# Patient Record
Sex: MALE | Race: WHITE | NOT HISPANIC OR LATINO | URBAN - METROPOLITAN AREA
[De-identification: names, ages, dates, MRNs, and addresses within clinical notes are randomized per-mention and may not be internally consistent; named-entity substitution may affect disease eponyms.]

---

## 2020-08-06 ENCOUNTER — OFFICE VISIT (OUTPATIENT)
Dept: INTERNAL MEDICINE | Facility: CLINIC | Age: 30
End: 2020-08-06
Payer: COMMERCIAL

## 2020-08-06 VITALS
WEIGHT: 245 LBS | HEART RATE: 78 BPM | OXYGEN SATURATION: 98 % | SYSTOLIC BLOOD PRESSURE: 128 MMHG | DIASTOLIC BLOOD PRESSURE: 78 MMHG | TEMPERATURE: 97.7 F | RESPIRATION RATE: 18 BRPM

## 2020-08-06 DIAGNOSIS — H66.002 NON-RECURRENT ACUTE SUPPURATIVE OTITIS MEDIA OF LEFT EAR WITHOUT SPONTANEOUS RUPTURE OF TYMPANIC MEMBRANE: Primary | ICD-10-CM

## 2020-08-06 DIAGNOSIS — H69.92 DYSFUNCTION OF LEFT EUSTACHIAN TUBE: ICD-10-CM

## 2020-08-06 PROBLEM — J30.2 SEASONAL ALLERGIES: Status: ACTIVE | Noted: 2020-08-06

## 2020-08-06 PROCEDURE — 99202 OFFICE O/P NEW SF 15 MIN: CPT | Performed by: NURSE PRACTITIONER

## 2020-08-06 RX ORDER — FLUTICASONE PROPIONATE 50 MCG
1 SPRAY, SUSPENSION (ML) NASAL DAILY
Qty: 1 BOTTLE | Refills: 5 | Status: SHIPPED | OUTPATIENT
Start: 2020-08-06 | End: 2024-08-05

## 2020-08-06 RX ORDER — AMOXICILLIN AND CLAVULANATE POTASSIUM 875; 125 MG/1; MG/1
1 TABLET, FILM COATED ORAL 2 TIMES DAILY
Qty: 14 TABLET | Refills: 0 | Status: SHIPPED | OUTPATIENT
Start: 2020-08-06 | End: 2020-08-13

## 2020-08-06 SDOH — HEALTH STABILITY: MENTAL HEALTH: HOW OFTEN DO YOU HAVE A DRINK CONTAINING ALCOHOL?: 2-3 TIMES A WEEK

## 2020-08-06 SDOH — HEALTH STABILITY: MENTAL HEALTH: HOW MANY DRINKS CONTAINING ALCOHOL DO YOU HAVE ON A TYPICAL DAY WHEN YOU ARE DRINKING?: 1 OR 2

## 2020-08-06 ASSESSMENT — ENCOUNTER SYMPTOMS
FREQUENCY: 0
VOMITING: 0
ARTHRALGIAS: 0
WEAKNESS: 0
ADENOPATHY: 0
EYE REDNESS: 0
CONSTIPATION: 0
SORE THROAT: 0
NAUSEA: 0
TROUBLE SWALLOWING: 0
SINUS PAIN: 0
FATIGUE: 0
MYALGIAS: 0
SINUS PRESSURE: 0
FEVER: 0
DIARRHEA: 0
LIGHT-HEADEDNESS: 0
CHEST TIGHTNESS: 0
PALPITATIONS: 0
COUGH: 0
DIZZINESS: 0
HEADACHES: 0
CHILLS: 0
DYSURIA: 0
EYE PAIN: 0
WHEEZING: 0
SHORTNESS OF BREATH: 0
ABDOMINAL PAIN: 0

## 2020-08-06 NOTE — PROGRESS NOTES
Subjective      Patient ID: Liban Lofton is a 29 y.o. male.    29-year-old male with a history of seasonal allergies presents to the office as a new patient today.  He like to establish care here.  He is concerned about intermittent bilateral ear pain he has been having for the last 2 to 3 months.  He does report some muffled hearing at times.  He does report popping in his ears as well.  He denies any fever, chills, cough, shortness of breath, chest pain, nasal congestion, postnasal drip, sore throat, ear drainage.  He does have a history of seasonal allergies does not routinely take anything for them.  He has not seen anyone for this issue before.  He does use Q-tips.  Denies any trauma to the ears.      The following have been reviewed and updated as appropriate in this visit:    Tobacco  Allergies  Meds  Problems  Med Hx  Surg Hx  Fam Hx  Soc Hx          Past Medical History:   Diagnosis Date   • Seasonal allergies      History reviewed. No pertinent surgical history.  Family History   Problem Relation Age of Onset   • Skin cancer Biological Mother    • Hypertension Biological Father    • Heart attack Paternal Grandfather 40     Social History     Tobacco Use   • Smoking status: Former Smoker   • Smokeless tobacco: Never Used   Substance Use Topics   • Alcohol use: Yes     Frequency: 2-3 times a week     Drinks per session: 1 or 2   • Drug use: Never       Review of Systems   Constitutional: Negative for chills, fatigue and fever.   HENT: Positive for ear pain. Negative for congestion, ear discharge, postnasal drip, sinus pressure, sinus pain, sore throat and trouble swallowing.    Eyes: Negative for pain and redness.   Respiratory: Negative for cough, chest tightness, shortness of breath and wheezing.    Cardiovascular: Negative for chest pain and palpitations.   Gastrointestinal: Negative for abdominal pain, constipation, diarrhea, nausea and vomiting.   Genitourinary: Negative for dysuria,  frequency and testicular pain.   Musculoskeletal: Negative for arthralgias and myalgias.   Skin: Negative for rash.   Neurological: Negative for dizziness, weakness, light-headedness and headaches.   Hematological: Negative for adenopathy.       No Known Allergies  Current Outpatient Medications   Medication Sig Dispense Refill   • amoxicillin-pot clavulanate (AUGMENTIN) 875-125 mg per tablet Take 1 tablet by mouth 2 (two) times a day for 7 days. 14 tablet 0   • fluticasone propionate (FLONASE) 50 mcg/actuation nasal spray Administer 1 spray into each nostril daily. 1 Bottle 5     No current facility-administered medications for this visit.        Objective   Vitals:    08/06/20 1523   BP: 128/78   Pulse: 78   Resp: 18   Temp: 36.5 °C (97.7 °F)   SpO2: 98%   Weight: 111 kg (245 lb)       Physical Exam   Constitutional: He is oriented to person, place, and time. He appears well-developed and well-nourished. No distress.   HENT:   Head: Normocephalic and atraumatic.   Right Ear: Hearing, external ear and ear canal normal. A middle ear effusion is present.   Left Ear: Hearing, external ear and ear canal normal. Tympanic membrane is erythematous and retracted.   Nose: Nose normal.   Eyes: Conjunctivae and EOM are normal.   Neck: Normal range of motion. Neck supple.   Cardiovascular: Normal rate, regular rhythm and normal heart sounds.   Pulmonary/Chest: Effort normal and breath sounds normal.   Neurological: He is alert and oriented to person, place, and time.   Skin: Skin is warm and dry. Capillary refill takes less than 2 seconds.   Psychiatric: He has a normal mood and affect. His behavior is normal. Judgment and thought content normal.   Vitals reviewed.      Assessment/Plan   Problem List Items Addressed This Visit        Nervous    Non-recurrent acute suppurative otitis media of left ear without spontaneous rupture of tympanic membrane - Primary     Will give course of Augmentin. Advise to call if symptoms persist  or worsen.          Dysfunction of left eustachian tube     PT advised to take flonase and sudafed.  If persists will discuss ENT               NELIDA Day    8/6/2020

## 2020-09-08 ENCOUNTER — OFFICE VISIT (OUTPATIENT)
Dept: INTERNAL MEDICINE | Facility: CLINIC | Age: 30
End: 2020-09-08
Payer: OTHER MISCELLANEOUS

## 2020-09-08 VITALS
DIASTOLIC BLOOD PRESSURE: 68 MMHG | BODY MASS INDEX: 33.27 KG/M2 | TEMPERATURE: 98.4 F | OXYGEN SATURATION: 98 % | HEART RATE: 80 BPM | SYSTOLIC BLOOD PRESSURE: 110 MMHG | HEIGHT: 73 IN | WEIGHT: 251 LBS

## 2020-09-08 DIAGNOSIS — M25.512 ACUTE PAIN OF LEFT SHOULDER: Primary | ICD-10-CM

## 2020-09-08 PROBLEM — S43.432A TEAR OF LEFT GLENOID LABRUM: Status: ACTIVE | Noted: 2020-09-08

## 2020-09-08 PROCEDURE — 99213 OFFICE O/P EST LOW 20 MIN: CPT | Performed by: NURSE PRACTITIONER

## 2020-09-08 ASSESSMENT — ENCOUNTER SYMPTOMS
WHEEZING: 0
PALPITATIONS: 0
COUGH: 0
FEVER: 0
EYE PAIN: 0
FREQUENCY: 0
VOMITING: 0
ARTHRALGIAS: 0
DYSURIA: 0
ADENOPATHY: 0
DIZZINESS: 0
LIGHT-HEADEDNESS: 0
ABDOMINAL PAIN: 0
NAUSEA: 0
MYALGIAS: 0
SINUS PRESSURE: 0
HEADACHES: 0
WEAKNESS: 0
FATIGUE: 0
SHORTNESS OF BREATH: 0
TROUBLE SWALLOWING: 0
SINUS PAIN: 0
SORE THROAT: 0
EYE REDNESS: 0
CHEST TIGHTNESS: 0
CHILLS: 0

## 2020-09-08 NOTE — ASSESSMENT & PLAN NOTE
With history of labral tear in the past, will send patient orthopedic for evaluation.  Do recommend physical therapy.  Ibuprofen 400 mg by mouth every 6 hours as needed for pain.  Patient advised to take with food.

## 2020-09-08 NOTE — PATIENT INSTRUCTIONS
Patient Education     SLAP Lesions Rehab  Ask your health care provider which exercises are safe for you. Do exercises exactly as told by your health care provider and adjust them as directed. It is normal to feel mild stretching, pulling, tightness, or discomfort as you do these exercises, but you should stop right away if you feel sudden pain or your pain gets worse. Do not begin these exercises until told by your health care provider.  Stretching and range of motion exercise  This exercise warms up your muscles and joints and improves the movement and flexibility of your shoulder. This exercise also helps to relieve pain and stiffness.  Exercise A: Passive shoulder horizontal adduction    1. Sit or stand and pull your left / right elbow across your chest, toward your other shoulder. Stop when you feel a gentle stretch in the back of your shoulder and upper arm.  ? Keep your arm at shoulder height.  ? Keep your arm as close to your body as you comfortably can.  2. Hold for __________ seconds.  3. Slowly return to the starting position.  Repeat __________ times. Complete this exercise __________ times a day.  Strengthening exercises  These exercises build strength and endurance in your shoulder. Endurance is the ability to use your muscles for a long time, even after they get tired.  Exercise B: Scapular protraction, supine    1. Lie on your back on a firm surface. If directed, hold a __________ weight in your left / right hand.  2. Raise your left / right arm straight into the air so your hand is directly above your shoulder joint.  3. Lift your shoulder off of the floor so you push the weight into the air. Do not move your head, neck, or back.  4. Hold for __________ seconds.  5. Slowly return to the starting position. Let your muscles relax completely before you repeat this exercise.  Repeat __________ times. Complete this exercise __________ times a day.  Exercise C: Scapular retraction    1. Sit in a stable  "chair without armrests, or stand.  2. Secure an exercise band to a stable object in front of you so the band is at shoulder height.  3. Hold one end of the exercise band in each hand.  4. Squeeze your shoulder blades together and move your elbows slightly behind you. Do not shrug your shoulders.  5. Hold for __________ seconds.  6. Slowly return to the starting position.  Repeat __________ times. Complete this exercise __________ times a day.  Exercise D: Shoulder external rotation  1. Lie down on your left / right side.  2. Bend your left / right elbow to an \"L\" shape (90 degrees). Place a small pillow or a rolled-up towel under your left / right upper arm.  3. With your elbow bent to 90 degrees, place your left / right hand on your abdomen.  4. Squeeze your shoulder blade back toward your spine.  5. Keeping your upper arm against the pillow or towel, move (pivot) your forearm and hand away from your abdomen and toward the ceiling. Keep your elbow bent to 90 degrees.  6. Hold for __________ seconds.  7. Slowly return to the starting position.  Repeat __________ times. Complete this exercise __________ times a day.  Exercise E: Shoulder extension, prone    1. Lie on your abdomen on a firm surface so your left / right arm hangs over the edge.  2. Hold a __________ weight in your hand so your palm faces in toward your body. Your arm should be straight.  3. Squeeze your shoulder blade down toward the middle of your back.  4. Slowly raise your arm behind you, up to the height of the surface that you are lying on. Keep your arm straight.  5. Hold for __________ seconds.  6. Slowly return to the starting position and relax your muscles.  Repeat __________ times. Complete this exercise __________ times a day.  This information is not intended to replace advice given to you by your health care provider. Make sure you discuss any questions you have with your health care provider.  Document Released: 12/18/2006 Document " Revised: 08/24/2017 Document Reviewed: 11/19/2016  [email or web address] Interactive Patient Education © 2019 [email or web address]     follow up with orthopedics and physical therapy as discussed
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
## 2021-04-13 ENCOUNTER — APPOINTMENT (RX ONLY)
Dept: URBAN - METROPOLITAN AREA CLINIC 23 | Facility: CLINIC | Age: 31
Setting detail: DERMATOLOGY
End: 2021-04-13

## 2021-04-13 DIAGNOSIS — L30.0 NUMMULAR DERMATITIS: ICD-10-CM

## 2021-04-13 DIAGNOSIS — D22 MELANOCYTIC NEVI: ICD-10-CM

## 2021-04-13 DIAGNOSIS — L81.4 OTHER MELANIN HYPERPIGMENTATION: ICD-10-CM

## 2021-04-13 PROBLEM — D22.5 MELANOCYTIC NEVI OF TRUNK: Status: ACTIVE | Noted: 2021-04-13

## 2021-04-13 PROBLEM — D48.5 NEOPLASM OF UNCERTAIN BEHAVIOR OF SKIN: Status: ACTIVE | Noted: 2021-04-13

## 2021-04-13 PROCEDURE — ? ADDITIONAL NOTES

## 2021-04-13 PROCEDURE — ? TREATMENT REGIMEN

## 2021-04-13 PROCEDURE — 99203 OFFICE O/P NEW LOW 30 MIN: CPT | Mod: 25

## 2021-04-13 PROCEDURE — ? PRESCRIPTION MEDICATION MANAGEMENT

## 2021-04-13 PROCEDURE — ? SUNSCREEN RECOMMENDATIONS

## 2021-04-13 PROCEDURE — ? BIOPSY BY SHAVE METHOD

## 2021-04-13 PROCEDURE — 11102 TANGNTL BX SKIN SINGLE LES: CPT

## 2021-04-13 PROCEDURE — ? COUNSELING

## 2021-04-13 ASSESSMENT — LOCATION SIMPLE DESCRIPTION DERM
LOCATION SIMPLE: LEFT LOWER BACK
LOCATION SIMPLE: RIGHT BUTTOCK
LOCATION SIMPLE: CHEST
LOCATION SIMPLE: LEFT UPPER BACK

## 2021-04-13 ASSESSMENT — LOCATION DETAILED DESCRIPTION DERM
LOCATION DETAILED: RIGHT BUTTOCK
LOCATION DETAILED: LEFT INFERIOR MEDIAL MIDBACK
LOCATION DETAILED: RIGHT MEDIAL SUPERIOR CHEST
LOCATION DETAILED: LEFT MID-UPPER BACK

## 2021-04-13 ASSESSMENT — LOCATION ZONE DERM: LOCATION ZONE: TRUNK

## 2021-04-13 NOTE — PROCEDURE: PRESCRIPTION MEDICATION MANAGEMENT
Samples Given: Cloderm
Initiate Treatment: Cloderm: Apply to affected area twice daily x 1-2 weeks until clear.
Detail Level: Zone
Render In Strict Bullet Format?: No

## 2021-04-13 NOTE — HPI: EVALUATION OF SKIN LESION(S)
What Type Of Note Output Would You Prefer (Optional)?: Standard Output
Hpi Title: Evaluation of Skin Lesions
How Severe Are Your Spot(S)?: mild
Have Your Spot(S) Been Treated In The Past?: has not been treated
Family Member: Aunt and grandfather

## 2021-05-11 ENCOUNTER — APPOINTMENT (RX ONLY)
Dept: URBAN - METROPOLITAN AREA CLINIC 23 | Facility: CLINIC | Age: 31
Setting detail: DERMATOLOGY
End: 2021-05-11

## 2021-05-11 DIAGNOSIS — D22 MELANOCYTIC NEVI: ICD-10-CM

## 2021-05-11 PROBLEM — D22.5 MELANOCYTIC NEVI OF TRUNK: Status: ACTIVE | Noted: 2021-05-11

## 2021-05-11 PROCEDURE — ? ADDITIONAL NOTES

## 2021-05-11 PROCEDURE — 12032 INTMD RPR S/A/T/EXT 2.6-7.5: CPT

## 2021-05-11 PROCEDURE — ? EXCISION

## 2021-05-11 PROCEDURE — 11402 EXC TR-EXT B9+MARG 1.1-2 CM: CPT

## 2021-05-11 ASSESSMENT — LOCATION DETAILED DESCRIPTION DERM: LOCATION DETAILED: LEFT INFERIOR MEDIAL MIDBACK

## 2021-05-11 ASSESSMENT — LOCATION SIMPLE DESCRIPTION DERM: LOCATION SIMPLE: LEFT LOWER BACK

## 2021-05-11 ASSESSMENT — LOCATION ZONE DERM: LOCATION ZONE: TRUNK

## 2021-05-11 NOTE — PROCEDURE: EXCISION
DISPLAY PLAN FREE TEXT Complex Repair And O-T Advancement Flap Text: The defect edges were debeveled with a #15 scalpel blade.  The primary defect was closed partially with a complex linear closure.  Given the location of the remaining defect, shape of the defect and the proximity to free margins an O-T advancement flap was deemed most appropriate for complete closure of the defect.  Using a sterile surgical marker, an appropriate advancement flap was drawn incorporating the defect and placing the expected incisions within the relaxed skin tension lines where possible.    The area thus outlined was incised deep to adipose tissue with a #15 scalpel blade.  The skin margins were undermined to an appropriate distance in all directions utilizing iris scissors.

## 2021-05-11 NOTE — PROCEDURE: EXCISION
----- Message from FAUSTINO Casas sent at 9/17/2019  3:40 PM EDT -----  Clinical Pool (Dr. Shaw):     Patient has low iron saturation. Discussed with Dr. Shaw.  Will need Injectafer.  Ordered in [place].  Please advise patient.     Electronically signed by FAUSTINO Casas, 09/17/19, 3:39 PM.     Complex Repair And Ftsg Text: The defect edges were debeveled with a #15 scalpel blade.  The primary defect was closed partially with a complex linear closure.  Given the location of the defect, shape of the defect and the proximity to free margins a full thickness skin graft was deemed most appropriate to repair the remaining defect.  The graft was trimmed to fit the size of the remaining defect.  The graft was then placed in the primary defect, oriented appropriately, and sutured into place.

## 2021-05-24 ENCOUNTER — APPOINTMENT (RX ONLY)
Dept: URBAN - METROPOLITAN AREA CLINIC 23 | Facility: CLINIC | Age: 31
Setting detail: DERMATOLOGY
End: 2021-05-24

## 2021-05-24 DIAGNOSIS — Z48.02 ENCOUNTER FOR REMOVAL OF SUTURES: ICD-10-CM

## 2021-05-24 PROCEDURE — ? ADDITIONAL NOTES

## 2021-05-24 PROCEDURE — ? SUTURE REMOVAL (GLOBAL PERIOD)

## 2021-05-24 PROCEDURE — ? PRESCRIPTION

## 2021-05-24 PROCEDURE — 99024 POSTOP FOLLOW-UP VISIT: CPT

## 2021-05-24 RX ORDER — MUPIROCIN 20 MG/G
OINTMENT TOPICAL
Qty: 1 | Refills: 1 | Status: ERX | COMMUNITY
Start: 2021-05-24

## 2021-05-24 RX ADMIN — MUPIROCIN: 20 OINTMENT TOPICAL at 00:00

## 2021-05-24 ASSESSMENT — LOCATION SIMPLE DESCRIPTION DERM: LOCATION SIMPLE: LEFT LOWER BACK

## 2021-05-24 ASSESSMENT — LOCATION ZONE DERM: LOCATION ZONE: TRUNK

## 2021-05-24 ASSESSMENT — LOCATION DETAILED DESCRIPTION DERM: LOCATION DETAILED: LEFT INFERIOR MEDIAL MIDBACK

## 2021-05-24 NOTE — PROCEDURE: SUTURE REMOVAL (GLOBAL PERIOD)
Detail Level: Simple
Add 75562 Cpt? (Important Note: In 2017 The Use Of 44928 Is Being Tracked By Cms To Determine Future Global Period Reimbursement For Global Periods): yes

## 2023-09-27 ENCOUNTER — APPOINTMENT (RX ONLY)
Dept: URBAN - METROPOLITAN AREA CLINIC 23 | Facility: CLINIC | Age: 33
Setting detail: DERMATOLOGY
End: 2023-09-27

## 2023-09-27 DIAGNOSIS — B07.8 OTHER VIRAL WARTS: ICD-10-CM

## 2023-09-27 PROBLEM — D48.5 NEOPLASM OF UNCERTAIN BEHAVIOR OF SKIN: Status: ACTIVE | Noted: 2023-09-27

## 2023-09-27 PROCEDURE — ? BIOPSY BY SHAVE METHOD

## 2023-09-27 PROCEDURE — 11102 TANGNTL BX SKIN SINGLE LES: CPT

## 2023-09-27 ASSESSMENT — LOCATION DETAILED DESCRIPTION DERM: LOCATION DETAILED: RIGHT SUPERIOR FOREHEAD

## 2023-09-27 ASSESSMENT — LOCATION ZONE DERM: LOCATION ZONE: FACE

## 2023-09-27 ASSESSMENT — LOCATION SIMPLE DESCRIPTION DERM: LOCATION SIMPLE: RIGHT FOREHEAD

## 2023-09-27 NOTE — HPI: EVALUATION OF SKIN LESION(S)
What Type Of Note Output Would You Prefer (Optional)?: Bullet Format
Hpi Title: Evaluation of a Skin Lesion
How Severe Are Your Spot(S)?: mild
Have Your Spot(S) Been Treated In The Past?: has not been treated
Family Member: Maternal aunt and maternal grandfather

## 2024-04-01 ENCOUNTER — OFFICE VISIT (OUTPATIENT)
Dept: INTERNAL MEDICINE | Facility: CLINIC | Age: 34
End: 2024-04-01
Payer: COMMERCIAL

## 2024-04-01 VITALS
WEIGHT: 264 LBS | HEIGHT: 74 IN | OXYGEN SATURATION: 97 % | HEART RATE: 63 BPM | TEMPERATURE: 98.7 F | BODY MASS INDEX: 33.88 KG/M2 | DIASTOLIC BLOOD PRESSURE: 90 MMHG | SYSTOLIC BLOOD PRESSURE: 124 MMHG

## 2024-04-01 DIAGNOSIS — J30.2 SEASONAL ALLERGIES: ICD-10-CM

## 2024-04-01 DIAGNOSIS — Z00.00 ANNUAL PHYSICAL EXAM: Primary | ICD-10-CM

## 2024-04-01 DIAGNOSIS — F41.9 ANXIETY: ICD-10-CM

## 2024-04-01 PROBLEM — F32.A ANXIETY AND DEPRESSION: Status: ACTIVE | Noted: 2024-04-01

## 2024-04-01 PROCEDURE — 99385 PREV VISIT NEW AGE 18-39: CPT | Mod: 25 | Performed by: NURSE PRACTITIONER

## 2024-04-01 PROCEDURE — 99214 OFFICE O/P EST MOD 30 MIN: CPT | Performed by: NURSE PRACTITIONER

## 2024-04-01 PROCEDURE — 3008F BODY MASS INDEX DOCD: CPT | Performed by: NURSE PRACTITIONER

## 2024-04-01 RX ORDER — BUSPIRONE HYDROCHLORIDE 5 MG/1
5 TABLET ORAL 2 TIMES DAILY
Qty: 180 TABLET | Refills: 0 | Status: SHIPPED | OUTPATIENT
Start: 2024-04-01 | End: 2024-08-05

## 2024-04-01 SDOH — ECONOMIC STABILITY: FOOD INSECURITY: WITHIN THE PAST 12 MONTHS, YOU WORRIED THAT YOUR FOOD WOULD RUN OUT BEFORE YOU GOT MONEY TO BUY MORE.: NEVER TRUE

## 2024-04-01 SDOH — ECONOMIC STABILITY: INCOME INSECURITY: IN THE LAST 12 MONTHS, WAS THERE A TIME WHEN YOU WERE NOT ABLE TO PAY THE MORTGAGE OR RENT ON TIME?: NO

## 2024-04-01 SDOH — ECONOMIC STABILITY: TRANSPORTATION INSECURITY
IN THE PAST 12 MONTHS, HAS LACK OF TRANSPORTATION KEPT YOU FROM MEETINGS, WORK, OR FROM GETTING THINGS NEEDED FOR DAILY LIVING?: NO

## 2024-04-01 SDOH — HEALTH STABILITY: PHYSICAL HEALTH: ON AVERAGE, HOW MANY DAYS PER WEEK DO YOU ENGAGE IN MODERATE TO STRENUOUS EXERCISE (LIKE A BRISK WALK)?: 0 DAYS

## 2024-04-01 SDOH — ECONOMIC STABILITY: FOOD INSECURITY: WITHIN THE PAST 12 MONTHS, THE FOOD YOU BOUGHT JUST DIDN'T LAST AND YOU DIDN'T HAVE MONEY TO GET MORE.: NEVER TRUE

## 2024-04-01 SDOH — ECONOMIC STABILITY: TRANSPORTATION INSECURITY
IN THE PAST 12 MONTHS, HAS THE LACK OF TRANSPORTATION KEPT YOU FROM MEDICAL APPOINTMENTS OR FROM GETTING MEDICATIONS?: NO

## 2024-04-01 SDOH — HEALTH STABILITY: PHYSICAL HEALTH: ON AVERAGE, HOW MANY MINUTES DO YOU ENGAGE IN EXERCISE AT THIS LEVEL?: 0 MIN

## 2024-04-01 ASSESSMENT — ENCOUNTER SYMPTOMS
UNEXPECTED WEIGHT CHANGE: 0
HEADACHES: 0
CHILLS: 0
CONSTIPATION: 0
BRUISES/BLEEDS EASILY: 0
FATIGUE: 0
ABDOMINAL PAIN: 0
ARTHRALGIAS: 0
WEAKNESS: 0
NAUSEA: 0
DYSPHORIC MOOD: 0
NECK PAIN: 0
SINUS PAIN: 0
TREMORS: 0
DIZZINESS: 0
FREQUENCY: 0
NERVOUS/ANXIOUS: 1
DYSURIA: 0
APPETITE CHANGE: 0
COUGH: 0
POLYPHAGIA: 0
POLYDIPSIA: 0
PALPITATIONS: 0
SEIZURES: 0
WHEEZING: 0
JOINT SWELLING: 0
NUMBNESS: 0
DIARRHEA: 0
VOMITING: 0
FEVER: 0
SHORTNESS OF BREATH: 0
ADENOPATHY: 0
BACK PAIN: 0

## 2024-04-01 ASSESSMENT — SOCIAL DETERMINANTS OF HEALTH (SDOH)
HOW OFTEN DO YOU ATTEND CHURCH OR RELIGIOUS SERVICES?: NEVER
WITHIN THE LAST YEAR, HAVE YOU BEEN KICKED, HIT, SLAPPED, OR OTHERWISE PHYSICALLY HURT BY YOUR PARTNER OR EX-PARTNER?: NO
WITHIN THE LAST YEAR, HAVE YOU BEEN AFRAID OF YOUR PARTNER OR EX-PARTNER?: NO
HOW OFTEN DO YOU GET TOGETHER WITH FRIENDS OR RELATIVES?: TWICE A WEEK
HOW OFTEN DO YOU ATTENT MEETINGS OF THE CLUB OR ORGANIZATION YOU BELONG TO?: MORE THAN 4 TIMES PER YEAR
DO YOU BELONG TO ANY CLUBS OR ORGANIZATIONS SUCH AS CHURCH GROUPS UNIONS, FRATERNAL OR ATHLETIC GROUPS, OR SCHOOL GROUPS?: YES
WITHIN THE LAST YEAR, HAVE TO BEEN RAPED OR FORCED TO HAVE ANY KIND OF SEXUAL ACTIVITY BY YOUR PARTNER OR EX-PARTNER?: NO
WITHIN THE LAST YEAR, HAVE YOU BEEN HUMILIATED OR EMOTIONALLY ABUSED IN OTHER WAYS BY YOUR PARTNER OR EX-PARTNER?: NO
IN A TYPICAL WEEK, HOW MANY TIMES DO YOU TALK ON THE PHONE WITH FAMILY, FRIENDS, OR NEIGHBORS?: TWICE A WEEK
HOW HARD IS IT FOR YOU TO PAY FOR THE VERY BASICS LIKE FOOD, HOUSING, MEDICAL CARE, AND HEATING?: NOT HARD AT ALL

## 2024-04-01 ASSESSMENT — PATIENT HEALTH QUESTIONNAIRE - PHQ9: SUM OF ALL RESPONSES TO PHQ9 QUESTIONS 1 & 2: 0

## 2024-04-01 ASSESSMENT — LIFESTYLE VARIABLES
HOW OFTEN DO YOU HAVE A DRINK CONTAINING ALCOHOL: 2-3 TIMES A WEEK
HOW OFTEN DO YOU HAVE SIX OR MORE DRINKS ON ONE OCCASION: NEVER
HOW MANY STANDARD DRINKS CONTAINING ALCOHOL DO YOU HAVE ON A TYPICAL DAY: 1 OR 2

## 2024-04-01 NOTE — ASSESSMENT & PLAN NOTE
Patient is a 33-year-old male who presents today to the office for his annual physical and to reestablish care in the office.  Is been several years since he has been to the office.  He is overdue for lab work.  He is not interested in the COVID-vaccine today he did have his flu vaccine.  Patient is up-to-date with the dentist and the eye doctor.  He states he sees a dermatologist on occasion because he has a skin lesion removed in the past.  Patient will obtain his labs and he will return to the office in 6 weeks for follow-up.

## 2024-04-01 NOTE — ASSESSMENT & PLAN NOTE
Patient has been diagnosed with anxiety and depression in the past.  He was on Zoloft and clonazepam.  He is not interested in having his medications again because he felt like he was overmedicated.  He does have a lot of responsibilities now with 3 children and new job and returning to school.  We did discuss having counseling services and he was referred to Nataliia Benitez our in-house psychotherapist.  He will also investigate to see if he can use his and place this program for counseling services.  We will start him on BuSpar 5 mg p.o. twice daily and have him return to the office in 6 weeks with labs.

## 2024-04-01 NOTE — PROGRESS NOTES
Internal Medicine Note       Reason for visit: Annual Exam and Re-Establish Care       HPI   Liban Lofton is a 33 y.o. male who presents to establish care and annual physical. Pt states he has had anxiety and depression in the past. Has had medication but did not like it. He has had 1-2 counseling sessions over 10 years ago. States he has anxiety but his depression is controlled at this time. States he has 3 chiildren under 6 years old. HE just started a new job.  Dentist 2x per year. Eye doctor 1 x per year for contacts. Sees derm 1x per year.         Past Medical History:   Diagnosis Date    Anxiety and depression     Seasonal allergies      Past Surgical History:   Procedure Laterality Date    MYRINGOTOMY W/ TUBES       Social History     Social History Narrative    Not on file     Family History   Problem Relation Age of Onset    Skin cancer Biological Mother     Hypertension Biological Father     Depression Biological Sister     Anxiety disorder Biological Sister     Skin cancer Maternal Grandmother     COPD Maternal Grandfather     Skin cancer Maternal Grandfather     Other Paternal Grandmother         covid    Heart attack Paternal Grandfather 40     Patient has no known allergies.  Current Outpatient Medications   Medication Sig Dispense Refill    busPIRone (BUSPAR) 5 mg tablet Take 1 tablet (5 mg total) by mouth 2 (two) times a day. 180 tablet 0    fluticasone propionate (FLONASE) 50 mcg/actuation nasal spray Administer 1 spray into each nostril daily. 1 Bottle 5     No current facility-administered medications for this visit.       Review of Systems   Constitutional:  Negative for appetite change, chills, fatigue, fever and unexpected weight change.   HENT:  Negative for hearing loss and sinus pain.    Eyes:  Negative for visual disturbance.   Respiratory:  Negative for cough, shortness of breath and wheezing.    Cardiovascular:  Negative for chest pain, palpitations and leg swelling.  "  Gastrointestinal:  Negative for abdominal pain, constipation, diarrhea, nausea and vomiting.   Endocrine: Negative for cold intolerance, heat intolerance, polydipsia, polyphagia and polyuria.   Genitourinary:  Negative for dysuria and frequency.   Musculoskeletal:  Negative for arthralgias, back pain, joint swelling and neck pain.   Skin: Negative.    Allergic/Immunologic: Negative for environmental allergies and food allergies.   Neurological:  Negative for dizziness, tremors, seizures, weakness, numbness and headaches.   Hematological:  Negative for adenopathy. Does not bruise/bleed easily.   Psychiatric/Behavioral:  Negative for dysphoric mood. The patient is nervous/anxious.        Objective   Vitals:    04/01/24 1352   BP: 124/90   Pulse: 63   Temp: 37.1 °C (98.7 °F)   SpO2: 97%       Physical Exam  Constitutional:       Appearance: Normal appearance.   HENT:      Right Ear: Tympanic membrane and ear canal normal.      Left Ear: Tympanic membrane and ear canal normal.      Mouth/Throat:      Mouth: Mucous membranes are moist.      Pharynx: No oropharyngeal exudate or posterior oropharyngeal erythema.   Eyes:      Conjunctiva/sclera: Conjunctivae normal.      Pupils: Pupils are equal, round, and reactive to light.   Cardiovascular:      Rate and Rhythm: Normal rate and regular rhythm.      Heart sounds: Normal heart sounds.   Pulmonary:      Effort: Pulmonary effort is normal.      Breath sounds: Normal breath sounds.   Abdominal:      General: Bowel sounds are normal. There is no distension.      Palpations: Abdomen is soft.      Tenderness: There is no abdominal tenderness.   Musculoskeletal:      Cervical back: Neck supple.   Skin:     General: Skin is warm and dry.   Neurological:      Mental Status: He is alert.         No results found for: \"WBC\", \"HGB\", \"PLT\", \"CHOL\", \"TRIG\", \"HDL\", \"LDLDIRECT\", \"ALT\", \"AST\", \"NA\", \"K\", \"CREATININE\", \"TSH\", \"INR\", \"HGBA1C\"       Current Outpatient Medications:     " busPIRone (BUSPAR) 5 mg tablet, Take 1 tablet (5 mg total) by mouth 2 (two) times a day., Disp: 180 tablet, Rfl: 0    fluticasone propionate (FLONASE) 50 mcg/actuation nasal spray, Administer 1 spray into each nostril daily., Disp: 1 Bottle, Rfl: 5      Assessment   Diagnoses and all orders for this visit:    Annual physical exam (Primary)  Assessment & Plan:  Patient is a 33-year-old male who presents today to the office for his annual physical and to reestablish care in the office.  Is been several years since he has been to the office.  He is overdue for lab work.  He is not interested in the COVID-vaccine today he did have his flu vaccine.  Patient is up-to-date with the dentist and the eye doctor.  He states he sees a dermatologist on occasion because he has a skin lesion removed in the past.  Patient will obtain his labs and he will return to the office in 6 weeks for follow-up.    Orders:  -     CBC and differential; Future  -     Comprehensive metabolic panel; Future  -     Lipid panel; Future  -     TSH w reflex FT4; Future  -     Hepatitis C antibody; Future  -     HIV 1,2 AB P24 AG; Future    Anxiety  Assessment & Plan:  Patient has been diagnosed with anxiety and depression in the past.  He was on Zoloft and clonazepam.  He is not interested in having his medications again because he felt like he was overmedicated.  He does have a lot of responsibilities now with 3 children and new job and returning to school.  We did discuss having counseling services and he was referred to Nataliia Benitez our in-house psychotherapist.  He will also investigate to see if he can use his and place this program for counseling services.  We will start him on BuSpar 5 mg p.o. twice daily and have him return to the office in 6 weeks with labs.      Seasonal allergies  Assessment & Plan:  Patient states his allergies are stable with Flonase.      Other orders  -     busPIRone (BUSPAR) 5 mg tablet; Take 1 tablet (5 mg total) by  mouth 2 (two) times a day.            NELIDA Arroyo  4/1/2024  2:01 PM

## 2024-04-12 ENCOUNTER — OFFICE VISIT (OUTPATIENT)
Dept: BEHAVIORAL HEALTH | Facility: CLINIC | Age: 34
End: 2024-04-12
Payer: COMMERCIAL

## 2024-04-12 DIAGNOSIS — F41.1 GENERALIZED ANXIETY DISORDER: Primary | ICD-10-CM

## 2024-04-12 PROCEDURE — 90791 PSYCH DIAGNOSTIC EVALUATION: CPT

## 2024-04-12 ASSESSMENT — COGNITIVE AND FUNCTIONAL STATUS - GENERAL
ORIENTATION: FULLY ORIENTED
SLEEP_WAKE_CYCLE: NO CHANGE
PSYCHOMOTOR FUNCTIONING: WNL
AROUSAL LEVEL: ALERT
APPEARANCE: WELL GROOMED
CONCENTRATION: IMPAIRED, MILD
REMOTE MEMORY: WNL
ATTENTION: WNL
SPEECH: REGULAR
IMPULSE CONTROL: INTACT
AFFECT: FULL RANGE
THOUGHT_PROCESS: WNL
PERCEPTUAL FUNCTION: NORMAL
RECENT MEMORY: WNL
APPETITE: INCREASED
EYE_CONTACT: WNL
INSIGHT: INTACT
MOOD: EUTHYMIC (NORMAL)
DELUSIONS: NONE OR AGE APPROPRIATE
THOUGHT_CONTENT: APPROPRIATE

## 2024-04-12 NOTE — PROGRESS NOTES
Integrated Behavioral Health Outpatient Initial Visit    Visit Type Performed: In-office     Liban Lofton presented today for a behavioral health visit.    Clinician confirmed identification of patient by name and birthdate.      Informed Consent/Confidentiality:   Pt was explained the model of primary care behavioral health we provide at [facility], including the model of care, documentation visibility, and confidentiality:    Model of Care: This is a low-intensity model of care (we provide 8-10 visits of cognitive-behavioral therapy), and the patient has the right to other options of behavioral health care that are indicated for more severe conditions (i.e.: Traditional Outpatient psychotherapy, Intensive Outpatient Programs, Partial Hospitalization Programs, and Inpatient services).    Documentation: The psychologist/licensed behavioral health provider collaborates regularly with the pt’s PCP regarding the pt’s treatment. The integrated behavioral health progress notes are visible to the physicians and advanced practitioners in the practice where the pt is being seen, [facility] (S) for continuity of care if pts choose to pursue medium-term therapy through [facility], in addition to [facility]'s billing and compliance departments, as needed.     The visit diagnosis and appointment/scheduling information is visible to the patient's EPIC chart, to provide continuity of care across the [facility] system. The pt will also have access to view their notes via [email or web address] (pt portal).    Confidentiality: Also discussed were confidentiality and the limits of confidentiality. Information shared by the patient with the undersigned provider are kept confidential, unless the patient makes an informed written request for to have their information shared with a specific party, or if a  mandates the release of information via a court order. If the patient is at imminent risk of suicide or homicide healthcare providers  (including psychologists and therapists) may need to break confidentiality to ensure the safety of the patient or others (ie: to engage emergency services). If child, elder, or other vulnerable population abuse or neglect is reported, your healthcare providers must follow mandated reporting requirements.    Patient was given the opportunity to ask clarifying questions, and they expressed understanding and consent: Yes        SUBJECTIVE     History (as relevant to visit diagnosis, presenting problem, or treatment)  History of Behavioral Health Treatment  Previous treatment: None.   Previously experienced symptoms of: anxiety and depression in college and recalls feeling overwhelmed with related adjustments and stress  Other pertinent behavioral health history: Liban reports that his father has always suffered from depression, anxiety and panic attacks       Substance Use History  ETOH/alcohol use: occasional, social use  Other substance or supplement use: drugs: denied.; tobacco: cigars socially ; caffeine: coffee 2 /day  And energy  drinks occasionally       Social History  Important people in pt's life/Support network: good support system mom, dad, sister and pretty close friends  Lives with: wife and 3 children 4, 3 and 5 months    Cultural practices/Protestant/Spiritual beliefs pertinent to treatment: Agnostic   Patient-Identified Strengths: not obtained  Hobbies/Interests:fitness usually but not a priority currently  Additional pertinent historical information includes: Liban has been working as a  for the past 8 years and recently transitioned into a  role. He is also enrolled an adult education program through [employer] which has added pressure on his plate with assignments and deadlines in conjunction with other responsibilities          Reported Symptoms  Depression symptoms:   PHQ 9:  Little Interest or Pleasure in Doing Things: 1-->several days    Feeling Down, Depressed  or Hopeless: 1-->several days    Trouble Falling or Staying Asleep, or Sleeping Too Much: 0-->not at all    Feeling Tired or Having Little Energy: 2-->more than half the days    Poor Appetite or Overeating: 3-->nearly every day (overeating- decribes self as having an unhealthy relationship with food)    Feeling Bad about Yourself - or that You are a Failure or Have Let Yourself or Your Family Down: 1-->several days    Trouble Concentrating on Things, Such as Reading the Newspaper or Watching Television: 2-->more than half the days    Moving or Speaking So Slowly that Other People Could Have Noticed? Or the Opposite - Being So Fidgety: 0-->not at all    Thoughts that You Would be Better Off Dead or of Hurting Yourself in Some Way: 0-->not at all    PHQ-9: Brief Depression Severity Measure Score: 10    If You Checked Off Any Problems, How Difficult Have These Problems Made It For You to Do Your Work, Take Care of Things at Home, or Get Along with Other People?: somewhat difficult (work)        Anxiety symptoms:   SINAN-7  Feeling nervous, anxious or on edge: 3-->Nearly every day    Not being able to stop or control worrying: 3-->Nearly every day    Worrying too much about different things: 3-->Nearly every day    Trouble relaxin-->Not at all    Being so restless that it is hard to sit still: 0-->Not at all    Becoming easily annoyed or irritable: 1-->Several days    Feeling afraid as if something awful might happen: 1-->Several days      GAD7 Total Score: : 11      If you checked off any problems, how difficult have these made it for you to do your work, take care of things at home, or get along with other people?: Somewhat difficult        Additional reported symptoms: Liban reports having had full on panic attacks while he was in college and describes feeling like the room was spinning as well as feeling hot and cold. Although he denies experiencing any of these symptoms recently, he does describe feeling like  "something is sitting on his chest         OBJECTIVE     Mental Status Exam  Appearance: Well Groomed  Speech: Regular  Psychomotor Functioning: WNL  Eye Contact: WNL  Orientation: Fully oriented  Attention: WNL  Concentration: Impaired, Mild  Recent Memory: WNL  Remote Memory: WNL  Thought Content: Appropriate  Thought Process: WNL  Insight: Intact  Perceptual Function: Normal  Delusions: None or age appropriate  Sleeping: No Change  Appetite: Increased  Affect: Full Range  Mood: Euthymic (normal)      ASSESSMENT     Psychotropic medications:   Liban has taken medication in the past but recalls having a negative experience with the Zoloft. Although he is prescribed Buspar, he reports that he has never taken it. He has reservations about medication having side effects as well as concerns regarding potential impact that taking medication would have on his employment due to protocol. He would like to see how he can manage without medication at this time.  Current Outpatient Medications   Medication Sig Dispense Refill   • busPIRone (BUSPAR) 5 mg tablet Take 1 tablet (5 mg total) by mouth 2 (two) times a day. 180 tablet 0   • fluticasone propionate (FLONASE) 50 mcg/actuation nasal spray Administer 1 spray into each nostril daily. 1 Bottle 5     No current facility-administered medications for this visit.         Suicidal Ideation/Homicidal Ideation Risk Assessment  Risk Factors: Multiple stressors  Protective factors: Effective and accessible mental health care , Connectedness to individuals, family, community, and social institutions and Problem-solving and conflict resolution skills    Suicidal Ideation: Not Present, No intention or plan.Liban reports that he has been in \"dark places\" in the past and has had passing thoughts about not being alive during those times. He could not recall the last time he had such thoughts and currently denies having thoughts, plans or an intent to harm himself. He states, \"I have " "too much to live for\"  Self Injurious Behavior:  Not Present  Homicidal Ideation: Not Present  Estimate of Current Risk: Minimal risk    Plan for Safety-   N/A:  Risk is assessed to be minimal; therefore, developing a safety plan is not indicated at this time.      Screening measures administered during this visit  PHQ-9: Brief Depression Severity Measure Score: 10  GAD7 Total Score: : 11      ASSESSMENT / IMPRESSIONS  Liban Lofton seems to be experiencing depression and anxiety which began occurring when he was in college. Symptoms have had ebbs and flows depending on the presence of stressors. As of late, he has had difficulty with motivation, feeling on edge and the spiraling of negative thoughts. He has experienced feelings of nervousness, worry, irritability and fear that something awful may happen. Crushing moments of anxiety have also contributed to periods of feeling down, low energy, difficulty concentrating and feeling bad about himself. Liban appears to meet the criteria for Generalized anxiety disorder.  Associated factors include financial stressors, feeling pressure from demands  Prognostic protective factors include, openness to treatment and positive supports. Prognostic risk factors include, hesitancy about medication.        PLAN     Goals:  To better manage anxiety and depression       Recommendations for treatment: Individual Therapy, 30 minutes 1-2 times monthly     Interventions: We discussed aspects of task management and goal setting due to struggles related to motivation and meeting deadlines    Recommendations for Interventions: Anxiety Reduction Techniques, Behavior Management, Cognitive Behavior Therapy, Collaborative Problem Solving, Empathic Listening and Validation, Goal Setting, Insight Development, Mindfulness, Monitoring of Symptoms and Self-Regulation Strategies    Next visit plan  Further discuss task management strategies due to overwhelm  Work out at least 2x a week "     I spent 65 minutes on this date of service performing the following activities: obtaining history and providing counseling and education.

## 2024-08-05 ENCOUNTER — OFFICE VISIT (OUTPATIENT)
Dept: INTERNAL MEDICINE | Facility: CLINIC | Age: 34
End: 2024-08-05
Payer: COMMERCIAL

## 2024-08-05 VITALS
HEIGHT: 74 IN | TEMPERATURE: 98.1 F | DIASTOLIC BLOOD PRESSURE: 92 MMHG | BODY MASS INDEX: 34.01 KG/M2 | WEIGHT: 265 LBS | OXYGEN SATURATION: 98 % | HEART RATE: 68 BPM | SYSTOLIC BLOOD PRESSURE: 128 MMHG

## 2024-08-05 DIAGNOSIS — F32.A ANXIETY AND DEPRESSION: Primary | ICD-10-CM

## 2024-08-05 DIAGNOSIS — F41.9 ANXIETY AND DEPRESSION: Primary | ICD-10-CM

## 2024-08-05 PROCEDURE — 3008F BODY MASS INDEX DOCD: CPT | Performed by: FAMILY MEDICINE

## 2024-08-05 PROCEDURE — 99214 OFFICE O/P EST MOD 30 MIN: CPT | Performed by: FAMILY MEDICINE

## 2024-08-05 RX ORDER — BUPROPION HYDROCHLORIDE 150 MG/1
150 TABLET ORAL DAILY
Qty: 90 TABLET | Refills: 1 | Status: SHIPPED | OUTPATIENT
Start: 2024-08-05 | End: 2024-10-30 | Stop reason: SDUPTHER

## 2024-08-06 PROBLEM — K60.2 ANAL FISSURE: Status: RESOLVED | Noted: 2017-12-07 | Resolved: 2024-08-06

## 2024-08-06 ASSESSMENT — ENCOUNTER SYMPTOMS
FATIGUE: 0
DYSPHORIC MOOD: 1
CHEST TIGHTNESS: 0
PALPITATIONS: 0
ARTHRALGIAS: 0
FEVER: 0
DIZZINESS: 0
COUGH: 0
NERVOUS/ANXIOUS: 1
SHORTNESS OF BREATH: 0

## 2024-08-06 NOTE — PROGRESS NOTES
Jay Hannah MD   [facility] in [address]   [address] [phone]     Reason for visit:   Chief Complaint   Patient presents with    New Patient    Anxiety        PCP: Jay Hannah MD     Patient: Liban Lofton      Age: 33 y.o. male (1990)    MRN: [id number]  Office Visit: 8/5/2024       Liban Lofton is a 33 y.o. male who presents for anxiety and depression.    He is overall healthy.  Works as a .  He does have 3 children under the age of 5.  He did recently start a new position within the department which has been a bit more stressful.  He does have significant family history of mental health disease including anxiety and depression.  He was evaluated earlier this year for symptoms of anxiety and depression.  He is still going through bouts of severe lows.  These episodes of depression can last even up to a few days.  They can happen a few times per month.  It certainly affects his family and work life.  He did not do well previously with seeing a therapist and never took the buspirone that was prescribed in April.  He would prefer to avoid medication, but knows he is at a point where it may be his best option.     Patient Active Problem List   Diagnosis    Non-recurrent acute suppurative otitis media of left ear without spontaneous rupture of tympanic membrane    Seasonal allergies    Dysfunction of left eustachian tube    Acute pain of left shoulder    Tear of left glenoid labrum    Anxiety and depression    Annual physical exam    Anxiety    GERD (gastroesophageal reflux disease)       Past Medical History:   Diagnosis Date    Anal fissure 12/07/2017    Last Assessment & Plan:    Discussed options    Will increase fiber ans water in diet , will also f/u with GI    Anxiety and depression     Seasonal allergies        Past Surgical History:   Procedure Laterality Date    MYRINGOTOMY W/ TUBES         Social  "History     Tobacco Use    Smoking status: Former     Packs/day: 0.50     Years: 3.00     Additional pack years: 0.00     Total pack years: 1.50     Types: Cigarettes     Passive exposure: Never    Smokeless tobacco: Never   Vaping Use    Vaping Use: Never used   Substance Use Topics    Alcohol use: Yes     Alcohol/week: 5.0 standard drinks of alcohol     Types: 5 Cans of beer per week    Drug use: Never       Family History   Problem Relation Age of Onset    Skin cancer Biological Mother     Hypertension Biological Father     Depression Biological Sister     Anxiety disorder Biological Sister     Skin cancer Maternal Grandmother     COPD Maternal Grandfather     Skin cancer Maternal Grandfather     Other Paternal Grandmother         covid    Heart attack Paternal Grandfather 40       No Known Allergies    Current Outpatient Medications   Medication Sig Dispense Refill    buPROPion XL (WELLBUTRIN XL) 150 mg 24 hr tablet Take 1 tablet (150 mg total) by mouth daily. 90 tablet 1     No current facility-administered medications for this visit.       Review of Systems   Constitutional:  Negative for fatigue and fever.   Eyes:  Negative for visual disturbance.   Respiratory:  Negative for cough, chest tightness and shortness of breath.    Cardiovascular:  Negative for chest pain, palpitations and leg swelling.   Musculoskeletal:  Negative for arthralgias.   Skin:  Negative for rash.   Neurological:  Negative for dizziness.   Psychiatric/Behavioral:  Positive for dysphoric mood. The patient is nervous/anxious.        Objective   Vitals:    08/05/24 1628   BP: (!) 128/92   BP Location: Right upper arm   Patient Position: Sitting   Pulse: 68   Temp: 36.7 °C (98.1 °F)   TempSrc: Oral   SpO2: 98%   Weight: 120 kg (265 lb)   Height: 1.88 m (6' 2\")     Body mass index is 34.02 kg/m².    Physical Exam  Vitals and nursing note reviewed.   Constitutional:       Appearance: Normal appearance. He is not toxic-appearing.   Eyes:      " "Conjunctiva/sclera: Conjunctivae normal.   Pulmonary:      Effort: Pulmonary effort is normal.   Musculoskeletal:      Cervical back: Normal range of motion.   Neurological:      General: No focal deficit present.      Mental Status: He is alert.   Psychiatric:         Mood and Affect: Mood normal.         Behavior: Behavior normal.         No results found for: \"WBC\", \"HGB\", \"HCT\", \"PLT\", \"CHOL\", \"TRIG\", \"HDL\", \"LDLDIRECT\", \"ALT\", \"AST\", \"NA\", \"K\", \"CL\", \"CREATININE\", \"BUN\", \"CO2\", \"TSH\", \"PSA\", \"INR\", \"HGBA1C\", \"MICROALBUR\"           Assessment    Diagnosis Plan   1. Anxiety and depression               Plan     We did discuss at length different options for treatment.  I do agree that at this point his best course of action would be a trial of medication.  Previously, he had tried Zoloft and had Klonopin as needed for anxiety.  He would certainly like to avoid medication with side effects, notably weight gain or sexual dysfunction.  We will start lower dose Wellbutrin and monitor symptoms.  Consider increase or change in medication pending results.  Consider reevaluation with therapist in the future.  We will reevaluate in a few months    No orders of the defined types were placed in this encounter.      New Medications Ordered This Visit   Medications    buPROPion XL (WELLBUTRIN XL) 150 mg 24 hr tablet     Sig: Take 1 tablet (150 mg total) by mouth daily.     Dispense:  90 tablet     Refill:  1       Medications Discontinued During This Encounter   Medication Reason    busPIRone (BUSPAR) 5 mg tablet     fluticasone propionate (FLONASE) 50 mcg/actuation nasal spray        No follow-ups on file.    There are no Patient Instructions on file for this visit.       Jay Hannah MD  8/6/2024     "

## 2024-10-30 RX ORDER — BUPROPION HYDROCHLORIDE 150 MG/1
150 TABLET ORAL DAILY
Qty: 2 TABLET | Refills: 0 | Status: SHIPPED | OUTPATIENT
Start: 2024-10-30 | End: 2024-11-18 | Stop reason: SDUPTHER

## 2024-10-30 NOTE — TELEPHONE ENCOUNTER
Pt is in [place], forgot to bring med.  Pt requesting 2 doses sent to [facility] in [place].

## 2024-11-18 ENCOUNTER — OFFICE VISIT (OUTPATIENT)
Dept: INTERNAL MEDICINE | Facility: CLINIC | Age: 34
End: 2024-11-18
Payer: COMMERCIAL

## 2024-11-18 VITALS
HEIGHT: 74 IN | TEMPERATURE: 98.1 F | SYSTOLIC BLOOD PRESSURE: 114 MMHG | WEIGHT: 262 LBS | DIASTOLIC BLOOD PRESSURE: 88 MMHG | OXYGEN SATURATION: 98 % | BODY MASS INDEX: 33.62 KG/M2 | HEART RATE: 72 BPM

## 2024-11-18 DIAGNOSIS — F41.9 ANXIETY AND DEPRESSION: Primary | ICD-10-CM

## 2024-11-18 DIAGNOSIS — R53.82 CHRONIC FATIGUE: ICD-10-CM

## 2024-11-18 DIAGNOSIS — F32.A ANXIETY AND DEPRESSION: Primary | ICD-10-CM

## 2024-11-18 DIAGNOSIS — Z00.00 ANNUAL PHYSICAL EXAM: ICD-10-CM

## 2024-11-18 DIAGNOSIS — Z11.4 SCREENING FOR HUMAN IMMUNODEFICIENCY VIRUS: ICD-10-CM

## 2024-11-18 DIAGNOSIS — N52.9 MALE ERECTILE DISORDER: ICD-10-CM

## 2024-11-18 DIAGNOSIS — Z11.59 NEED FOR HEPATITIS C SCREENING TEST: ICD-10-CM

## 2024-11-18 PROCEDURE — 3008F BODY MASS INDEX DOCD: CPT | Performed by: FAMILY MEDICINE

## 2024-11-18 PROCEDURE — 99214 OFFICE O/P EST MOD 30 MIN: CPT | Performed by: FAMILY MEDICINE

## 2024-11-18 RX ORDER — SILDENAFIL 50 MG/1
50 TABLET, FILM COATED ORAL DAILY PRN
Qty: 20 TABLET | Refills: 0 | Status: SHIPPED | OUTPATIENT
Start: 2024-11-18

## 2024-11-18 RX ORDER — BUPROPION HYDROCHLORIDE 300 MG/1
300 TABLET ORAL DAILY
Qty: 90 TABLET | Refills: 1 | Status: SHIPPED | OUTPATIENT
Start: 2024-11-18

## 2024-11-18 ASSESSMENT — ENCOUNTER SYMPTOMS
FEVER: 0
DIZZINESS: 0
COUGH: 0
DYSPHORIC MOOD: 1
FATIGUE: 1
CHEST TIGHTNESS: 0
NERVOUS/ANXIOUS: 1
ARTHRALGIAS: 0
SHORTNESS OF BREATH: 0
PALPITATIONS: 0

## 2024-11-18 NOTE — PROGRESS NOTES
Jay Hannah MD   [facility] in [address]   [address] [phone]     Reason for visit:   Chief Complaint   Patient presents with    Follow-up       PCP: Jay Hannah MD     Patient: Liban Lofton      Age: 34 y.o. male (1990)    MRN: [id number]  Office Visit: 11/18/2024  _______________________________    Liban Lofton is a 34 y.o. male who presents for follow-up anxiety.    I did see him about 3 months ago with symptoms of anxiety and depression.  At the time he was having bouts of severe lows where he would last days with episodes of depression.  He did not do well previously was seeing a therapist and never took previously prescribed buspirone.  We did start Wellbutrin at that visit and he has had a noticeable improvement of symptoms.  No major adverse effects besides some intermittent headaches.  He is inquiring about potentially increasing the dose of medication.    He also notes intermittent issues with erectile dysfunction.  Agrees that it may be psychosomatic in nature, but would like testosterone level checked.  He has not had labs for years.    Patient Active Problem List   Diagnosis    Non-recurrent acute suppurative otitis media of left ear without spontaneous rupture of tympanic membrane    Seasonal allergies    Dysfunction of left eustachian tube    Acute pain of left shoulder    Tear of left glenoid labrum    Anxiety and depression    Annual physical exam    Anxiety    GERD (gastroesophageal reflux disease)       Past Medical History:   Diagnosis Date    Anal fissure 12/07/2017    Last Assessment & Plan:    Discussed options    Will increase fiber ans water in diet , will also f/u with GI    Anxiety and depression     Seasonal allergies        Past Surgical History   Procedure Laterality Date    Myringotomy w/ tubes         Social History     Tobacco Use    Smoking status: Former     Current packs/day: 0.50      "Average packs/day: 0.5 packs/day for 3.0 years (1.5 ttl pk-yrs)     Types: Cigarettes     Passive exposure: Never    Smokeless tobacco: Never   Vaping Use    Vaping status: Never Used   Substance Use Topics    Alcohol use: Yes     Alcohol/week: 5.0 standard drinks of alcohol     Types: 5 Cans of beer per week    Drug use: Never       Family History   Problem Relation Name Age of Onset    Skin cancer Biological Mother      Hypertension Biological Father      Depression Biological Sister      Anxiety disorder Biological Sister      Skin cancer Maternal Grandmother      COPD Maternal Grandfather      Skin cancer Maternal Grandfather      Other Paternal Grandmother          covid    Heart attack Paternal Grandfather  40       No Known Allergies    Current Outpatient Medications   Medication Sig Dispense Refill    buPROPion XL (WELLBUTRIN XL) 300 mg 24 hr tablet Take 1 tablet (300 mg total) by mouth daily. 90 tablet 1    sildenafiL (VIAGRA) 50 mg tablet Take 1 tablet (50 mg total) by mouth daily as needed for erectile dysfunction. GoodRx 20 tablet 0     No current facility-administered medications for this visit.       Review of Systems   Constitutional:  Positive for fatigue. Negative for fever.   Eyes:  Negative for visual disturbance.   Respiratory:  Negative for cough, chest tightness and shortness of breath.    Cardiovascular:  Negative for chest pain, palpitations and leg swelling.   Musculoskeletal:  Negative for arthralgias.   Skin:  Negative for rash.   Neurological:  Negative for dizziness.   Psychiatric/Behavioral:  Positive for dysphoric mood. The patient is nervous/anxious.        Objective   Vitals:    11/18/24 1359   BP: 114/88   BP Location: Right upper arm   Patient Position: Sitting   Pulse: 72   Temp: 36.7 °C (98.1 °F)   TempSrc: Oral   SpO2: 98%   Weight: 119 kg (262 lb)   Height: 1.88 m (6' 2\")     Body mass index is 33.64 kg/m².    Physical Exam  Vitals and nursing note reviewed.   Constitutional:  " "     Appearance: Normal appearance. He is not toxic-appearing.   Eyes:      Conjunctiva/sclera: Conjunctivae normal.   Pulmonary:      Effort: Pulmonary effort is normal.   Musculoskeletal:      Cervical back: Normal range of motion.   Neurological:      General: No focal deficit present.      Mental Status: He is alert.   Psychiatric:         Mood and Affect: Mood normal.         Behavior: Behavior normal.         No results found for: \"WBC\", \"HGB\", \"HCT\", \"PLT\", \"CHOL\", \"TRIG\", \"HDL\", \"LDLDIRECT\", \"ALT\", \"AST\", \"NA\", \"K\", \"CL\", \"CREATININE\", \"BUN\", \"CO2\", \"TSH\", \"PSA\", \"INR\", \"HGBA1C\", \"MICROALBUR\"      Assessment & Plan  Anxiety and depression         Male erectile disorder         Chronic fatigue    Orders:    Testosterone, free, total; Future    Need for hepatitis C screening test    Orders:    Hepatitis C antibody; Future    Screening for human immunodeficiency virus    Orders:    HIV 1,2 AB P24 AG; Future       PLAN    He overall is doing well with Wellbutrin.  We will increase to 300 mg and monitor symptoms.  Consider therapist versus change in the future if necessary.    We will get routine labs.  I will also get a testosterone level given his mild erectile dysfunction symptoms.  I will also provide him sildenafil to take as needed.    Orders Placed This Encounter   Procedures    CBC and Differential     Standing Status:   Future     Number of Occurrences:   1     Standing Expiration Date:   11/18/2025     Order Specific Question:   Release to patient     Answer:   Immediate [1]    Comprehensive metabolic panel     Standing Status:   Future     Number of Occurrences:   1     Standing Expiration Date:   11/18/2025     Order Specific Question:   Release to patient     Answer:   Immediate [1]    Hemoglobin A1c     Standing Status:   Future     Number of Occurrences:   1     Standing Expiration Date:   11/18/2025     Order Specific Question:   Release to patient     Answer:   Immediate [1]    Lipid panel     " Standing Status:   Future     Number of Occurrences:   1     Standing Expiration Date:   11/18/2025     Order Specific Question:   Release to patient     Answer:   Immediate [1]    TSH w reflex FT4     Standing Status:   Future     Number of Occurrences:   1     Standing Expiration Date:   11/18/2025     Order Specific Question:   Release to patient     Answer:   Immediate [1]    HIV 1,2 AB P24 AG     Standing Status:   Future     Number of Occurrences:   1     Standing Expiration Date:   11/18/2025    Hepatitis C antibody     Standing Status:   Future     Number of Occurrences:   1     Standing Expiration Date:   11/18/2025     Order Specific Question:   Release to patient     Answer:   Immediate [1]    Testosterone, free, total     Standing Status:   Future     Number of Occurrences:   1     Standing Expiration Date:   11/18/2025     Order Specific Question:   Release to patient     Answer:   Immediate [1]       New Medications Ordered This Visit   Medications    sildenafiL (VIAGRA) 50 mg tablet     Sig: Take 1 tablet (50 mg total) by mouth daily as needed for erectile dysfunction. GoodRx     Dispense:  20 tablet     Refill:  0    buPROPion XL (WELLBUTRIN XL) 300 mg 24 hr tablet     Sig: Take 1 tablet (300 mg total) by mouth daily.     Dispense:  90 tablet     Refill:  1       Medications Discontinued During This Encounter   Medication Reason    buPROPion XL (WELLBUTRIN XL) 150 mg 24 hr tablet Reorder       No follow-ups on file.    There are no Patient Instructions on file for this visit.        Jay Hannah MD  11/18/2024

## 2025-05-22 DIAGNOSIS — N52.9 ERECTILE DYSFUNCTION, UNSPECIFIED ERECTILE DYSFUNCTION TYPE: Primary | ICD-10-CM

## 2025-05-22 RX ORDER — BUPROPION HYDROCHLORIDE 300 MG/1
300 TABLET ORAL DAILY
Qty: 90 TABLET | Refills: 1 | Status: CANCELLED | OUTPATIENT
Start: 2025-05-22

## 2025-05-23 RX ORDER — BUPROPION HYDROCHLORIDE 300 MG/1
300 TABLET ORAL DAILY
Qty: 30 TABLET | Refills: 5 | Status: SHIPPED | OUTPATIENT
Start: 2025-05-23

## 2025-05-27 RX ORDER — SILDENAFIL 50 MG/1
50 TABLET, FILM COATED ORAL DAILY PRN
Qty: 20 TABLET | Refills: 0 | Status: SHIPPED | OUTPATIENT
Start: 2025-05-27

## 2025-06-21 LAB
ALBUMIN SERPL-MCNC: 4.6 G/DL (ref 4.1–5.1)
ALP SERPL-CCNC: 61 IU/L (ref 44–121)
ALT SERPL-CCNC: 21 IU/L (ref 0–44)
AST SERPL-CCNC: 21 IU/L (ref 0–40)
BASOPHILS # BLD AUTO: 0 X10E3/UL (ref 0–0.2)
BASOPHILS NFR BLD AUTO: 1 %
BILIRUB SERPL-MCNC: 0.6 MG/DL (ref 0–1.2)
BUN SERPL-MCNC: 12 MG/DL (ref 6–20)
BUN/CREAT SERPL: 10 (ref 9–20)
CALCIUM SERPL-MCNC: 9.4 MG/DL (ref 8.7–10.2)
CHLORIDE SERPL-SCNC: 102 MMOL/L (ref 96–106)
CHOLEST SERPL-MCNC: 188 MG/DL (ref 100–199)
CO2 SERPL-SCNC: 21 MMOL/L (ref 20–29)
CREAT SERPL-MCNC: 1.18 MG/DL (ref 0.76–1.27)
EGFRCR SERPLBLD CKD-EPI 2021: 83 ML/MIN/1.73
EOSINOPHIL # BLD AUTO: 0.1 X10E3/UL (ref 0–0.4)
EOSINOPHIL NFR BLD AUTO: 2 %
ERYTHROCYTE [DISTWIDTH] IN BLOOD BY AUTOMATED COUNT: 12.8 % (ref 11.6–15.4)
GLOBULIN SER CALC-MCNC: 2.8 G/DL (ref 1.5–4.5)
GLUCOSE SERPL-MCNC: 91 MG/DL (ref 70–99)
HBA1C MFR BLD: 5.4 % (ref 4.8–5.6)
HCT VFR BLD AUTO: 44.8 % (ref 37.5–51)
HCV IGG SERPL QL IA: NON REACTIVE
HDLC SERPL-MCNC: 36 MG/DL
HGB BLD-MCNC: 15.1 G/DL (ref 13–17.7)
HIV 1+2 AB+HIV1 P24 AG SERPL QL IA: NON REACTIVE
IMM GRANULOCYTES # BLD AUTO: 0 X10E3/UL (ref 0–0.1)
IMM GRANULOCYTES NFR BLD AUTO: 0 %
LDLC SERPL CALC-MCNC: 137 MG/DL (ref 0–99)
LYMPHOCYTES # BLD AUTO: 2.4 X10E3/UL (ref 0.7–3.1)
LYMPHOCYTES NFR BLD AUTO: 38 %
MCH RBC QN AUTO: 32.1 PG (ref 26.6–33)
MCHC RBC AUTO-ENTMCNC: 33.7 G/DL (ref 31.5–35.7)
MCV RBC AUTO: 95 FL (ref 79–97)
MONOCYTES # BLD AUTO: 0.5 X10E3/UL (ref 0.1–0.9)
MONOCYTES NFR BLD AUTO: 7 %
NEUTROPHILS # BLD AUTO: 3.4 X10E3/UL (ref 1.4–7)
NEUTROPHILS NFR BLD AUTO: 52 %
PLATELET # BLD AUTO: 259 X10E3/UL (ref 150–450)
POTASSIUM SERPL-SCNC: 4.7 MMOL/L (ref 3.5–5.2)
PROT SERPL-MCNC: 7.4 G/DL (ref 6–8.5)
RBC # BLD AUTO: 4.71 X10E6/UL (ref 4.14–5.8)
SODIUM SERPL-SCNC: 138 MMOL/L (ref 134–144)
T4 FREE SERPL-MCNC: 1.39 NG/DL (ref 0.82–1.77)
TESTOST FREE SERPL-MCNC: 8.1 PG/ML (ref 8.7–25.1)
TESTOST SERPL-MCNC: 312 NG/DL (ref 264–916)
TRIGL SERPL-MCNC: 83 MG/DL (ref 0–149)
TSH SERPL DL<=0.005 MIU/L-ACNC: 0.74 UIU/ML (ref 0.45–4.5)
VLDLC SERPL CALC-MCNC: 15 MG/DL (ref 5–40)
WBC # BLD AUTO: 6.5 X10E3/UL (ref 3.4–10.8)

## 2025-06-23 ENCOUNTER — RESULTS FOLLOW-UP (OUTPATIENT)
Dept: INTERNAL MEDICINE | Facility: CLINIC | Age: 35
End: 2025-06-23

## 2025-08-13 ENCOUNTER — OFFICE VISIT (OUTPATIENT)
Dept: INTERNAL MEDICINE | Facility: CLINIC | Age: 35
End: 2025-08-13
Payer: COMMERCIAL

## 2025-08-13 VITALS
OXYGEN SATURATION: 97 % | BODY MASS INDEX: 33.88 KG/M2 | TEMPERATURE: 97.8 F | HEART RATE: 70 BPM | DIASTOLIC BLOOD PRESSURE: 86 MMHG | SYSTOLIC BLOOD PRESSURE: 112 MMHG | HEIGHT: 74 IN | WEIGHT: 264 LBS

## 2025-08-13 DIAGNOSIS — M25.561 LATERAL KNEE PAIN, RIGHT: Primary | ICD-10-CM

## 2025-08-13 DIAGNOSIS — R79.89 LOW TESTOSTERONE: ICD-10-CM

## 2025-08-13 DIAGNOSIS — F32.A ANXIETY AND DEPRESSION: ICD-10-CM

## 2025-08-13 DIAGNOSIS — F41.9 ANXIETY AND DEPRESSION: ICD-10-CM

## 2025-08-13 PROCEDURE — 3008F BODY MASS INDEX DOCD: CPT | Performed by: FAMILY MEDICINE

## 2025-08-13 PROCEDURE — 99214 OFFICE O/P EST MOD 30 MIN: CPT | Performed by: FAMILY MEDICINE

## 2025-08-13 ASSESSMENT — ENCOUNTER SYMPTOMS
DIZZINESS: 0
SHORTNESS OF BREATH: 0
FEVER: 0
CHEST TIGHTNESS: 0
PALPITATIONS: 0
COUGH: 0
FATIGUE: 1
ARTHRALGIAS: 1

## 2025-08-13 ASSESSMENT — PATIENT HEALTH QUESTIONNAIRE - PHQ9: SUM OF ALL RESPONSES TO PHQ9 QUESTIONS 1 & 2: 0
